# Patient Record
Sex: FEMALE | Race: ASIAN | NOT HISPANIC OR LATINO | Employment: FULL TIME | URBAN - METROPOLITAN AREA
[De-identification: names, ages, dates, MRNs, and addresses within clinical notes are randomized per-mention and may not be internally consistent; named-entity substitution may affect disease eponyms.]

---

## 2018-04-25 NOTE — PROGRESS NOTES
SUBJECTIVE: Dora Roberts , a 32 year old  female, presents for counseling and information regarding upcoming travel to [facility]. Special medical concerns include: None. She anticipates the following unusual exposures: None.    Itinerary:  [facility]    Departure Date: 4/30/18 Return date: 6/27/18    Reason for travel (i.e. Business, pleasure): pleasure    Visiting an urban or rural area?: rural    Accommodations (i.e. hotel, hostel, friends, family, etc): family    Women - First day of your last period: 4/13/18    IMMUNIZATION HISTORY  Have you received any vaccinations in the past 4 weeks?  No  Have you ever fainted from having your blood drawn or from an injection?  No  Have you ever had a fever reaction to vaccination?  No  Have you ever had any bad reaction or side effect from any vaccination?  No  Have you ever had hepatitis A or B vaccine?  No  Do you live (or work closely) with anyone who has AIDS, an AIDS-like condition, any other immune disorder or who is on chemotherapy for cancer?  No  Have you received any injection of immune globulin or any blood products during the past 12 months?  No    GENERAL MEDICAL HISTORY  Do you have a medical condition that warrants maintenance medication or physician follow-up?  No  Do you have a medical condition that is stable now, but that may recur while traveling?  No  Has your spleen been removed?  No  Have you had an acute illness or a fever in the past 48 hours?  No  Are you pregnant, or might you become pregnant on this trip?  Any chance of pregnancy?  No  Are you breastfeeding?  No  Do you have HIV, AIDS, an AIDS-like condition, any other immune disorder, leukemia or cancer?  No  Do you have a severe combined immunodeficiency disease?  No  Have you had your thymus gland removed or history of problems with your thymus, such as myasthenia gravis, DiGeorge syndrome, or thymoma?  No    Do you have severe thrombocytopenia (low platelet count) or a coagulation disorder?  No  Have  you ever had a convulsion, seizure, epilepsy, neurologic condition or brain infection?  No  Do you have any stomach conditions?  No  Do you have a G6PD deficiency?  No  Do you have severe renal or kidney impairment?  No  Do you have a history of psychiatric problems?  No  Do you have a problem with strange dreams and/or nightmares?  No  Do you have insomnia?  No  Do you have problems with vaginitis?  No  Do you have psoriasis?  No  Are you prone to motion sickness?  No  Have you ever had headaches, nausea, vomiting, or breathing problems from altitude exposure?  No      History reviewed. No pertinent past medical history.   Immunization History   Administered Date(s) Administered     HepA-Adult 04/26/2018     TDAP Vaccine (Adacel) 04/26/2018     Typhoid IM 04/26/2018       Current Outpatient Prescriptions   Medication Sig Dispense Refill     azithromycin (ZITHROMAX) 500 MG tablet Take 1 tablet (500 mg) by mouth daily As needed for traveler's diarrhea 3 tablet 0     IBUPROFEN PO Take 200 mg by mouth every 6 hours as needed for moderate pain       No Known Allergies     EXAM: deferred    Immunizations discussed include: Hepatitis A, Hepatitis B, Typhoid, Rabies, Japanese Encephalitis, Tetanus/Diphtheria and Measles/Mumps/Rubella  Malaraia prophylaxis recommended: Insect precautions only  Symptomatic treatment for traveler's diarrhea: loperamide/diphenoxylate and azithromycin     ASSESSMENT/PLAN:  1. Travel advice encounter  - azithromycin (ZITHROMAX) 500 MG tablet; Take 1 tablet (500 mg) by mouth daily As needed for traveler's diarrhea  Dispense: 3 tablet; Refill: 0  - HEPA VACCINE ADULT IM  - TDAP VACCINE (ADACEL)  - TYPHOID VACCINE, IM    I have reviewed general recommendations for safe travel   including: food/water precautions, insect avoidance, safe sex   practices given high prevalence of HIV and other STDs,   roadway safety. Educational materials and links to the CDC   Traveler's health website have been  provided.    Total time 15 minutes, greater than 50 percent in counseling   and coordination of care.

## 2018-04-26 ENCOUNTER — OFFICE VISIT (OUTPATIENT)
Dept: FAMILY MEDICINE | Facility: CLINIC | Age: 33
End: 2018-04-26
Payer: COMMERCIAL

## 2018-04-26 VITALS
SYSTOLIC BLOOD PRESSURE: 101 MMHG | OXYGEN SATURATION: 100 % | DIASTOLIC BLOOD PRESSURE: 66 MMHG | RESPIRATION RATE: 12 BRPM | HEIGHT: 59 IN | HEART RATE: 75 BPM | TEMPERATURE: 98.1 F | BODY MASS INDEX: 29.03 KG/M2 | WEIGHT: 144 LBS

## 2018-04-26 DIAGNOSIS — Z71.84 TRAVEL ADVICE ENCOUNTER: Primary | ICD-10-CM

## 2018-04-26 PROCEDURE — 90715 TDAP VACCINE 7 YRS/> IM: CPT | Performed by: FAMILY MEDICINE

## 2018-04-26 PROCEDURE — 90471 IMMUNIZATION ADMIN: CPT | Performed by: FAMILY MEDICINE

## 2018-04-26 PROCEDURE — 90691 TYPHOID VACCINE IM: CPT | Performed by: FAMILY MEDICINE

## 2018-04-26 PROCEDURE — 99401 PREV MED CNSL INDIV APPRX 15: CPT | Mod: 25 | Performed by: FAMILY MEDICINE

## 2018-04-26 PROCEDURE — 90472 IMMUNIZATION ADMIN EACH ADD: CPT | Performed by: FAMILY MEDICINE

## 2018-04-26 PROCEDURE — 90632 HEPA VACCINE ADULT IM: CPT | Performed by: FAMILY MEDICINE

## 2018-04-26 RX ORDER — AZITHROMYCIN 500 MG/1
500 TABLET, FILM COATED ORAL DAILY
Qty: 3 TABLET | Refills: 0 | Status: SHIPPED | OUTPATIENT
Start: 2018-04-26

## 2018-04-26 NOTE — MR AVS SNAPSHOT
"              After Visit Summary   2018    Dora Roberts    MRN: [id number]           Patient Information     Date Of Birth          1985        Visit Information        Provider Department      2018 7:30 AM Michelle Wagner DO; LANGUAGE [facility]        Today's Diagnoses     Travel advice encounter    -  1       Follow-ups after your visit        Who to contact     If you have questions or need follow up information about today's clinic visit or your schedule please contact [facility] directly at [phone].  Normal or non-critical lab and imaging results will be communicated to you by [facility], letter or phone within 4 business days after the clinic has received the results. If you do not hear from us within 7 days, please contact the clinic through [facility] or phone. If you have a critical or abnormal lab result, we will notify you by phone as soon as possible.  Submit refill requests through [facility] or call your pharmacy and they will forward the refill request to us. Please allow 3 business days for your refill to be completed.          Additional Information About Your Visit        MyChart Information     [facility] lets you send messages to your doctor, view your test results, renew your prescriptions, schedule appointments and more. To sign up, go to [email or web address] . Click on \"Log in\" on the left side of the screen, which will take you to the Welcome page. Then click on \"Sign up Now\" on the right side of the page.     You will be asked to enter the access code listed below, as well as some personal information. Please follow the directions to create your username and password.     Your access code is: [email or web address]  Expires: 2018  9:19 AM     Your access code will  in 90 days. If you need help or a new code, please call your [facility] or [phone].        Care EveryWhere ID     This is your Care EveryWhere ID. This could " "be used by other organizations to access your [facility] medical records  [id number]        Your Vitals Were     Pulse Temperature Respirations Height Last Period Pulse Oximetry    75 98.1  F (36.7  C) (Oral) 12 4' 11\" (1.499 m) 04/13/2018 100%    BMI (Body Mass Index)                   29.08 kg/m2            Blood Pressure from Last 3 Encounters:   04/26/18 101/66    Weight from Last 3 Encounters:   04/26/18 144 lb (65.3 kg)              We Performed the Following     HEPA VACCINE ADULT IM     TDAP VACCINE (ADACEL)     TYPHOID VACCINE, IM          Today's Medication Changes          These changes are accurate as of 4/26/18  9:19 AM.  If you have any questions, ask your nurse or doctor.               Start taking these medicines.        Dose/Directions    azithromycin 500 MG tablet   Commonly known as:  ZITHROMAX   Used for:  Travel advice encounter   Started by:  Michelle Wagner DO        Dose:  500 mg   Take 1 tablet (500 mg) by mouth daily As needed for traveler's diarrhea   Quantity:  3 tablet   Refills:  0            Where to get your medicines      These medications were sent to [id number] - [place], [place] - [address], [place] [address]     Phone:  [phone]     azithromycin 500 MG tablet                Primary Care Provider Fax #    Physician No Ref-Primary [phone]       No address on file        Equal Access to Services     NICKI GRAHAM AH: Haddalia medina Sogiovanni, waaxda luqadaha, qaybta kaalmada adetracie, ministerio murdock . So [facility] [phone].    ATENCIÓN: Si habla español, tiene a iraheta disposición servicios gratuitos de asistencia lingüística. Llame al [phone].    We comply with applicable federal civil rights laws and [place] laws. We do not discriminate on the basis of race, color, national origin, age, disability, sex, sexual orientation, or gender identity.            Thank you!  "    Thank you for choosing [facility]  for your care. Our goal is always to provide you with excellent care. Hearing back from our patients is one way we can continue to improve our services. Please take a few minutes to complete the written survey that you may receive in the mail after your visit with us. Thank you!             Your Updated Medication List - Protect others around you: Learn how to safely use, store and throw away your medicines at www.disposemymeds.org.          This list is accurate as of 4/26/18  9:19 AM.  Always use your most recent med list.                   Brand Name Dispense Instructions for use Diagnosis    azithromycin 500 MG tablet    ZITHROMAX    3 tablet    Take 1 tablet (500 mg) by mouth daily As needed for traveler's diarrhea    Travel advice encounter       IBUPROFEN PO      Take 200 mg by mouth every 6 hours as needed for moderate pain

## 2023-03-02 ENCOUNTER — APPOINTMENT (OUTPATIENT)
Dept: CT IMAGING | Facility: CLINIC | Age: 38
End: 2023-03-02
Attending: EMERGENCY MEDICINE
Payer: OTHER MISCELLANEOUS

## 2023-03-02 ENCOUNTER — HOSPITAL ENCOUNTER (EMERGENCY)
Facility: CLINIC | Age: 38
Discharge: HOME | End: 2023-03-02
Attending: EMERGENCY MEDICINE | Admitting: EMERGENCY MEDICINE
Payer: OTHER MISCELLANEOUS

## 2023-03-02 VITALS
DIASTOLIC BLOOD PRESSURE: 80 MMHG | HEART RATE: 68 BPM | OXYGEN SATURATION: 100 % | SYSTOLIC BLOOD PRESSURE: 115 MMHG | TEMPERATURE: 97 F | RESPIRATION RATE: 16 BRPM

## 2023-03-02 DIAGNOSIS — S02.5XXB OPEN FRACTURE OF TOOTH, INITIAL ENCOUNTER: ICD-10-CM

## 2023-03-02 DIAGNOSIS — S01.81XA LACERATION OF FOREHEAD, INITIAL ENCOUNTER: ICD-10-CM

## 2023-03-02 DIAGNOSIS — R55 VASOVAGAL SYNCOPE: ICD-10-CM

## 2023-03-02 PROCEDURE — 70450 CT HEAD/BRAIN W/O DYE: CPT

## 2023-03-02 PROCEDURE — 99284 EMERGENCY DEPT VISIT MOD MDM: CPT | Mod: 25

## 2023-03-02 PROCEDURE — 12011 RPR F/E/E/N/L/M 2.5 CM/<: CPT

## 2023-03-02 PROCEDURE — 93005 ELECTROCARDIOGRAM TRACING: CPT

## 2023-03-02 RX ORDER — CHLORHEXIDINE GLUCONATE ORAL RINSE 1.2 MG/ML
15 SOLUTION DENTAL 2 TIMES DAILY
Qty: 118 ML | Refills: 0 | Status: SHIPPED | OUTPATIENT
Start: 2023-03-02

## 2023-03-02 RX ORDER — PENICILLIN V POTASSIUM 500 MG/1
500 TABLET, FILM COATED ORAL 3 TIMES DAILY
Qty: 30 TABLET | Refills: 0 | Status: SHIPPED | OUTPATIENT
Start: 2023-03-02 | End: 2023-03-12

## 2023-03-02 ASSESSMENT — ENCOUNTER SYMPTOMS
WOUND: 1
DIZZINESS: 1

## 2023-03-02 ASSESSMENT — ACTIVITIES OF DAILY LIVING (ADL): ADLS_ACUITY_SCORE: 35

## 2023-03-02 NOTE — ED NOTES
Rapid Assessment Note    History:   Dora Roberts is a 37 year old female who presents for evaluation of a fall. 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 He explains that she lost consciousness for about a minute. She has had an episode similar to this one 4-5 years ago but she did not lose consciousness. Dora has no medical problems and does not take any medications.    Exam:   General:  Alert, interactive  Cardiovascular:  Well perfused  Lungs:  No respiratory distress, no accessory muscle use  Neuro:  Moving all 4 extremities  Skin:  Warm, dry  Psych:  Normal affect  ***    Plan of Care:   I evaluated the patient and developed an initial plan of care. I discussed this plan and explained that I, or one of my partners, would be returning to complete the evaluation.         I, Seth Fay, am serving as a scribe to document services personally performed by Roosevelt Marin MD, based on my observations and the provider's statements to me.    3/2/2023  [employer]    Portions of this medical record were completed by a scribe. UPON MY REVIEW AND AUTHENTICATION BY ELECTRONIC SIGNATURE, this confirms (a) I performed the applicable clinical services, and (b) the record is accurate.

## 2023-03-02 NOTE — ED TRIAGE NOTES
Pt presents to ED via EMS from work. 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 Pt chipped a tooth. Pt is alert and at her baseline mentally per . Pt had a similar episode several years ago. Denies chest pain or sob.

## 2023-03-03 LAB
ATRIAL RATE - MUSE: 65 BPM
DIASTOLIC BLOOD PRESSURE - MUSE: NORMAL MMHG
INTERPRETATION ECG - MUSE: NORMAL
P AXIS - MUSE: 2 DEGREES
PR INTERVAL - MUSE: 130 MS
QRS DURATION - MUSE: 68 MS
QT - MUSE: 378 MS
QTC - MUSE: 393 MS
R AXIS - MUSE: 66 DEGREES
SYSTOLIC BLOOD PRESSURE - MUSE: NORMAL MMHG
T AXIS - MUSE: 41 DEGREES
VENTRICULAR RATE- MUSE: 65 BPM

## 2023-03-03 NOTE — DISCHARGE INSTRUCTIONS
Keep wound clean and dry.  Due to your tooth injury please avoid solid food and to clear liquids or anything soft that does not require biting or chewing.  Please followed up with a dentist ASAP due to dental injury.  Forehead sutures need to be removed in 6 days.  Use Neosporin to the skin.  We are offering penicillin to prevent dental infection.

## 2023-03-03 NOTE — ED PROVIDER NOTES
History     Chief Complaint:  Fall       HPI   Dora Roberts is a 37 year old female who presents for evaluation of a fall. 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 He explains that she lost consciousness for about a minute. She has had an episode similar to this one 4-5 years ago but she did not lose consciousness. Dora has no medical problems and does not take any medications.      Independent Historian: Patient's     Review of External Notes: N/A     ROS:  Review of Systems   Skin: Positive for wound (laceration of forehead).   Neurological: Positive for dizziness and syncope.   All other systems reviewed and are negative.      Allergies:  No Known Allergies     Medications:    The patient is not currently taking any prescribed medications.    Past Medical History:    The patient denies any significant past medical history.    Social History:  Patient arrives by private vehicle with her     Physical Exam     Patient Vitals for the past 24 hrs:   BP Temp Temp src Pulse Resp SpO2   03/02/23 1524 104/60 97  F (36.1  C) Temporal 67 16 98 %        Physical Exam  Vitals reviewed.   HENT:      Head: Normocephalic.        Right Ear: Tympanic membrane normal.      Left Ear: Tympanic membrane normal.      Nose: Nose normal.   Cardiovascular:      Rate and Rhythm: Normal rate and regular rhythm.   Pulmonary:      Effort: Pulmonary effort is normal.      Breath sounds: Normal breath sounds.   Musculoskeletal:      Cervical back: Normal range of motion. No tenderness.   Skin:     General: Skin is warm.      Capillary Refill: Capillary refill takes less than 2 seconds.   Neurological:      General: No focal deficit present.      Mental Status: She is alert and oriented to person, place, and time.   Psychiatric:         Mood and Affect: Mood normal.         Emergency  Department Course   ECG  ECG taken at 1530, ECG read at 1533  Sinus rhythm   Normal ECG    Rate 65 bpm. WV interval 130 ms. QRS duration 68 ms. QT/QTc 378/393 ms. P-R-T axes 2 6 41.     Imaging:  CT Head w/o Contrast   Final Result   IMPRESSION:   1.  No acute traumatic intracranial abnormality.    2.  Anterior frontal scalp soft tissue laceration without underlying displaced fracture.               Report per radiology      Procedures     Laceration Repair      Procedure: Laceration Repair    Indication: Laceration    Consent: Verbal    Location:  Face     Length: 1.2 cm    Preparation: Irrigation with Sterile Saline.    Anesthesia/Sedation: Lidocaine with Epinephrine - 1%      Treatment/Exploration: Wound explored, no foreign bodies found     Closure: The wound was closed with one layer. Skin/superficial layer was closed with 6 x 6-0 Nylon using Interrupted sutures.     Patient Status: The patient tolerated the procedure well: Yes. There were no complications.    PROCEDURE NOTE: [facility] emergency room does not stock wires or fixation wires therefore I am unable to fixate the left front tooth.  The slightly avulsed but it will hold until follow-up with dentistry.  Al was applied to right front incisor due to deep fracture of the right front tooth.  Patient tolerated this well without complication.      Emergency Department Course & Assessments       Interventions/Assessments:  1820 I obtained history and examined the patient as noted above    Independent Interpretation (X-rays, CTs, rhythm strip):  I independently interpreted the patient's EKG in real time     Social Determinants of Health affecting care:   Supportive       Disposition:  The patient was discharged to home.     Impression & Plan    Medical Decision Making:  Patient presents with likely vasovagal syncope.  Patient was at work and now feels fine.  Did consider lab work with patient's lab work was already drawn this week without complication to a  few days ago however due to epic complications unable to see.  Patient is well-appearing EKG shows sinus rhythm without QT prolongation.  Vitals are stable wound was cared for dentition was fixed to the best of my Ability the recommend follow-up with dentistry.  Patient was offered Peridex will swish and spit Neosporin for the skin penicillin prophylaxis due to deep fracture of the right frontal incisor and follow-up with dentistry ASAP.  Patient was discharged in stable condition.    Diagnosis:    ICD-10-CM    1. Open fracture of tooth, initial encounter  S02.5XXB       2. Laceration of forehead, initial encounter  S01.81XA       3. Vasovagal syncope  R55            Discharge Medications:  New Prescriptions    CHLORHEXIDINE (PERIDEX) 0.12 % SOLUTION    Swish and spit 15 mLs in mouth 2 times daily    NEOMYCIN-POLYMYXIN-PRAMOXINE (NEOSPORIN PLUS) 1 % EXTERNAL CREAM    Apply to forehead twice  day to seal skin    PENICILLIN V (VEETID) 500 MG TABLET    Take 1 tablet (500 mg) by mouth 3 times daily for 10 days          Scribe Disclosure:  I, Seth Fay, am serving as a scribe at 6:41 PM on 3/2/2023 to document services personally performed by Roosevelt Marin MD based on my observations and the provider's statements to me.   3/2/2023   Roosevelt Marin MD Goodman, Brian Samuel, MD  03/02/23 1923

## 2023-11-30 ENCOUNTER — OFFICE VISIT (OUTPATIENT)
Dept: URGENT CARE | Facility: URGENT CARE | Age: 38
End: 2023-11-30
Payer: COMMERCIAL

## 2023-11-30 VITALS
SYSTOLIC BLOOD PRESSURE: 109 MMHG | HEART RATE: 78 BPM | OXYGEN SATURATION: 100 % | TEMPERATURE: 98 F | RESPIRATION RATE: 20 BRPM | DIASTOLIC BLOOD PRESSURE: 76 MMHG

## 2023-11-30 DIAGNOSIS — J02.9 SORE THROAT: Primary | ICD-10-CM

## 2023-11-30 LAB
DEPRECATED S PYO AG THROAT QL EIA: NEGATIVE
GROUP A STREP BY PCR: NOT DETECTED

## 2023-11-30 PROCEDURE — 99203 OFFICE O/P NEW LOW 30 MIN: CPT | Performed by: FAMILY MEDICINE

## 2023-11-30 PROCEDURE — 87651 STREP A DNA AMP PROBE: CPT | Performed by: FAMILY MEDICINE

## 2023-11-30 RX ORDER — IBUPROFEN 800 MG/1
800 TABLET, FILM COATED ORAL EVERY 8 HOURS PRN
Qty: 30 TABLET | Refills: 0 | Status: SHIPPED | OUTPATIENT
Start: 2023-11-30

## 2023-11-30 RX ORDER — ACETAMINOPHEN 500 MG
500-1000 TABLET ORAL EVERY 6 HOURS PRN
Qty: 100 TABLET | Refills: 0 | Status: SHIPPED | OUTPATIENT
Start: 2023-11-30

## 2023-11-30 NOTE — PROGRESS NOTES
SUBJECTIVE:   Dora Roberts is a 37 year old female presenting with a chief complaint of sore throat, headache.  Endorsed tactile fever and fatigue  Onset of symptoms was 2 day(s) ago.  Course of illness is worsening.    Severity moderate  Current and Associated symptoms: sore throat, fatigue  Treatment measures tried include Tylenol/Ibuprofen, Fluids, and Rest.  Predisposing factors include ill contact: Family member .    Other family members were sick and she caught it from them, family is better    No N/V/D.  Minimal cough.  Declined COVID    Would like RX ibuprofen and tylenol    No past medical history on file.  Current Outpatient Medications   Medication Sig Dispense Refill    azithromycin (ZITHROMAX) 500 MG tablet Take 1 tablet (500 mg) by mouth daily As needed for traveler's diarrhea 3 tablet 0    chlorhexidine (PERIDEX) 0.12 % solution Swish and spit 15 mLs in mouth 2 times daily 118 mL 0    IBUPROFEN PO Take 200 mg by mouth every 6 hours as needed for moderate pain      neomycin-polymyxin-pramoxine (NEOSPORIN PLUS) 1 % external cream Apply to forehead twice  day to seal skin 14 g 0     Social History     Tobacco Use    Smoking status: Never    Smokeless tobacco: Never   Substance Use Topics    Alcohol use: Not on file       ROS:  Review of systems negative except as stated above.    OBJECTIVE:  /76   Pulse 78   Temp 98  F (36.7  C)   Resp 20   SpO2 100%   GENERAL APPEARANCE: healthy, alert and no distress  EYES: EOMI,  PERRL, conjunctiva clear  HENT: ear canals and TM's normal.  Nose and mouth without ulcers, erythema or lesions  RESP: lungs with no audible wheezes or increase work of breathing  PSYCH: mentation appears normal and affect normal/bright    Results for orders placed or performed in visit on 11/30/23   Streptococcus A Rapid Screen w/Reflex to PCR     Status: Normal    Specimen: Throat; Swab   Result Value Ref Range    Group A Strep antigen Negative Negative        ASSESSMENT/PLAN:  (J02.9) Sore throat  (primary encounter diagnosis)  Comment: viral  Plan: Streptococcus A Rapid Screen w/Reflex to PCR,         Group A Streptococcus PCR Throat Swab,         ibuprofen (ADVIL/MOTRIN) 800 MG tablet,         acetaminophen (TYLENOL) 500 MG tablet            Reassurance given, reviewed that symptoms most likely viral etiology and encourage symptomatic treatment with tylenol, ibuprofen, plenty of fluids and rest.  Will follow up on throat culture and treat if positive for strep.  Declined COVID screen.  RX ibuprofen 800 mg and RX tylenol 500 mg given for treatment    Follow up with primary provider if no improvement of symptoms in 1 week    Geovanny Magallon MD  November 30, 2023 1:27 PM

## (undated) RX ORDER — LIDOCAINE HYDROCHLORIDE AND EPINEPHRINE 10; 10 MG/ML; UG/ML
INJECTION, SOLUTION INFILTRATION; PERINEURAL
Status: DISPENSED
Start: 2023-03-02